# Patient Record
Sex: FEMALE | Race: AMERICAN INDIAN OR ALASKA NATIVE
[De-identification: names, ages, dates, MRNs, and addresses within clinical notes are randomized per-mention and may not be internally consistent; named-entity substitution may affect disease eponyms.]

---

## 2022-05-25 ENCOUNTER — HOSPITAL ENCOUNTER (EMERGENCY)
Dept: HOSPITAL 5 - ED | Age: 47
Discharge: HOME | End: 2022-05-25
Payer: COMMERCIAL

## 2022-05-25 VITALS — DIASTOLIC BLOOD PRESSURE: 60 MMHG | SYSTOLIC BLOOD PRESSURE: 145 MMHG

## 2022-05-25 DIAGNOSIS — Z88.1: ICD-10-CM

## 2022-05-25 DIAGNOSIS — R10.13: Primary | ICD-10-CM

## 2022-05-25 DIAGNOSIS — Z72.89: ICD-10-CM

## 2022-05-25 DIAGNOSIS — F17.290: ICD-10-CM

## 2022-05-25 DIAGNOSIS — Z88.0: ICD-10-CM

## 2022-05-25 DIAGNOSIS — R07.89: ICD-10-CM

## 2022-05-25 LAB
ALBUMIN SERPL-MCNC: 4.2 G/DL (ref 3.9–5)
ALT SERPL-CCNC: 27 UNITS/L (ref 7–56)
BASOPHILS # (AUTO): 0 K/MM3 (ref 0–0.1)
BASOPHILS NFR BLD AUTO: 0.7 % (ref 0–1.8)
BILIRUB UR QL STRIP: (no result)
BLOOD UR QL VISUAL: (no result)
BUN SERPL-MCNC: 10 MG/DL (ref 7–17)
BUN SERPL-MCNC: 9 MG/DL (ref 7–17)
BUN/CREAT SERPL: 13 %
BUN/CREAT SERPL: 14 %
CALCIUM SERPL-MCNC: 8.2 MG/DL (ref 8.4–10.2)
CALCIUM SERPL-MCNC: 8.7 MG/DL (ref 8.4–10.2)
EOSINOPHIL # BLD AUTO: 0 K/MM3 (ref 0–0.4)
EOSINOPHIL NFR BLD AUTO: 0.7 % (ref 0–4.3)
HCT VFR BLD CALC: 39.7 % (ref 30.3–42.9)
HEMOLYSIS INDEX: 4
HEMOLYSIS INDEX: 6
HGB BLD-MCNC: 13.2 GM/DL (ref 10.1–14.3)
INR PPP: 0.87 (ref 0.87–1.13)
LYMPHOCYTES # BLD AUTO: 1.7 K/MM3 (ref 1.2–5.4)
LYMPHOCYTES NFR BLD AUTO: 33.3 % (ref 13.4–35)
MCHC RBC AUTO-ENTMCNC: 33 % (ref 30–34)
MCV RBC AUTO: 91 FL (ref 79–97)
MONOCYTES # (AUTO): 0.2 K/MM3 (ref 0–0.8)
MONOCYTES % (AUTO): 3.6 % (ref 0–7.3)
MUCOUS THREADS #/AREA URNS HPF: (no result) /HPF
PH UR STRIP: 9 [PH] (ref 5–7)
PLATELET # BLD: 327 K/MM3 (ref 140–440)
RBC # BLD AUTO: 4.37 M/MM3 (ref 3.65–5.03)
RBC #/AREA URNS HPF: 3 /HPF (ref 0–6)
UROBILINOGEN UR-MCNC: < 2 MG/DL (ref ?–2)
WBC #/AREA URNS HPF: 1 /HPF (ref 0–6)

## 2022-05-25 PROCEDURE — 85025 COMPLETE CBC W/AUTO DIFF WBC: CPT

## 2022-05-25 PROCEDURE — 99285 EMERGENCY DEPT VISIT HI MDM: CPT

## 2022-05-25 PROCEDURE — 96366 THER/PROPH/DIAG IV INF ADDON: CPT

## 2022-05-25 PROCEDURE — 81025 URINE PREGNANCY TEST: CPT

## 2022-05-25 PROCEDURE — 71046 X-RAY EXAM CHEST 2 VIEWS: CPT

## 2022-05-25 PROCEDURE — 81001 URINALYSIS AUTO W/SCOPE: CPT

## 2022-05-25 PROCEDURE — 96375 TX/PRO/DX INJ NEW DRUG ADDON: CPT

## 2022-05-25 PROCEDURE — 36415 COLL VENOUS BLD VENIPUNCTURE: CPT

## 2022-05-25 PROCEDURE — 80048 BASIC METABOLIC PNL TOTAL CA: CPT

## 2022-05-25 PROCEDURE — 85610 PROTHROMBIN TIME: CPT

## 2022-05-25 PROCEDURE — 84484 ASSAY OF TROPONIN QUANT: CPT

## 2022-05-25 PROCEDURE — 80320 DRUG SCREEN QUANTALCOHOLS: CPT

## 2022-05-25 PROCEDURE — 83735 ASSAY OF MAGNESIUM: CPT

## 2022-05-25 PROCEDURE — 80053 COMPREHEN METABOLIC PANEL: CPT

## 2022-05-25 PROCEDURE — 83690 ASSAY OF LIPASE: CPT

## 2022-05-25 PROCEDURE — 96365 THER/PROPH/DIAG IV INF INIT: CPT

## 2022-05-25 PROCEDURE — 82962 GLUCOSE BLOOD TEST: CPT

## 2022-05-25 PROCEDURE — 93005 ELECTROCARDIOGRAM TRACING: CPT

## 2022-05-25 PROCEDURE — C9113 INJ PANTOPRAZOLE SODIUM, VIA: HCPCS

## 2022-05-25 PROCEDURE — G0480 DRUG TEST DEF 1-7 CLASSES: HCPCS

## 2022-05-25 NOTE — EMERGENCY DEPARTMENT REPORT
ED General Adult HPI





- General


Chief complaint: Alcohol


Stated complaint: Epigastric abdominal pain and burning


Time Seen by Provider: 22 11:19


Source: patient, EMS ( EMS documentation not available at time of chart 

dictation ), RN notes reviewed


Mode of arrival: Stretcher


Limitations: No Limitations





- History of Present Illness


Initial comments: 





The patient was evaluated in the emergency department for symptoms described in 

the history of present illness.  He/she was evaluated in the context of the 

global COVID-19 pandemic, which necessitated consideration that the patient 

might be at risk for infection with the virus that causes COVID-19.  

Institutional protocols and algorithms that pertain to the evaluation of 

patients at risk for COVID-19 are in a state of rapid change based on 

information released by regulatory bodies including the CDC and federal and 

state organizations.  These policies and algorithms were followed during the pat

ient's care in the emergency department.  Please note that these policies, 

procedures and recommendations changed on a rapid basis.





This is a pleasant and cooperative 46-year-old female, with a history of 

bariatric surgery, presenting to the ER today with a complaint of epigastric and

central chest wall pain and burning, after consuming alcohol yesterday.  Pain 

increases with palpation and with swallowing.  The patient denies headache, 

midline neck pain, diaphoresis and exertional shortness of breath.  She has had 

some mild nausea and vomiting.  It is now resolved.  It is nonexertional.  She 

denies aspirin use, oral contraceptive use, leg pain, leg swelling, the 

possibility of pregnancy, and she denies DVT/PE risk factors.  Also denies 

family history of DVT/PE/ACS.





She is asking for something to eat and drink at this time.  She reports some 

nonspecific depression, but she is not homicidal or suicidal





Symptoms started at 9:00 this morning


-: hour(s)


Location: abdomen


Radiation: other (Chest wall pain)


Severity scale (0 -10): 4


Quality: burning, aching


Consistency: constant


Improves with: medication, rest


Worsens with: eating, movement, other (Palpation)





- Related Data


                                  Previous Rx's











 Medication  Instructions  Recorded  Last Taken  Type


 


Acetaminophen [Non-Aspirin Extra 500 mg PO Q6HR PRN #30 tablet 22 Unknown 

Rx





Strength]    


 


Metoclopramide [Reglan] 10 mg PO QID PRN #30 tablet 22 Unknown Rx


 


Multivitamin with Folic Acid [Cvs 400 mcg PO QDAY #30 tablet 22 Unknown Rx





One Daily Essential Tablet]    


 


Pantoprazole [Protonix TAB] 20 mg PO BID #60 tab 22 Unknown Rx











                                    Allergies











Allergy/AdvReac Type Severity Reaction Status Date / Time


 


cephalexin [From Keflex] Allergy  Rash Verified 22 12:39


 


Penicillins Allergy  Rash Verified 22 12:39














ED Review of Systems


ROS: 


Stated complaint: CP/NAUSEA/TREMORS


Other details as noted in HPI





Constitutional: denies: fever


Eyes: denies: eye discharge


ENT: denies: epistaxis


Respiratory: denies: shortness of breath


Cardiovascular: chest pain (Chest wall pain)


Gastrointestinal: abdominal pain (Epigastric abdominal pain), nausea, vomiting


Genitourinary: denies: dysuria


Musculoskeletal: denies: back pain


Neurological: denies: weakness


Psychiatric: anxiety.  denies: homicidal thoughts, suicidal thoughts





ED Past Medical Hx





- Social History


Smoking Status: Never Smoker


Substance Use Type: Alcohol





- Medications


Home Medications: 


                                Home Medications











 Medication  Instructions  Recorded  Confirmed  Last Taken  Type


 


Acetaminophen [Non-Aspirin Extra 500 mg PO Q6HR PRN #30 tablet 22  Unknown

 Rx





Strength]     


 


Metoclopramide [Reglan] 10 mg PO QID PRN #30 tablet 22  Unknown Rx


 


Multivitamin with Folic Acid [Cvs 400 mcg PO QDAY #30 tablet 22  Unknown 

Rx





One Daily Essential Tablet]     


 


Pantoprazole [Protonix TAB] 20 mg PO BID #60 tab 22  Unknown Rx














ED Physical Exam





- General


Limitations: No Limitations


General appearance: alert, in no apparent distress





- Head


Head exam: Present: atraumatic, normocephalic





- Eye


Eye exam: Present: normal appearance, EOMI.  Absent: nystagmus





- ENT


ENT exam: Present: normal exam, normal orophraynx, mucous membranes moist, 

normal external ear exam





- Neck


Neck exam: Present: normal inspection, full ROM.  Absent: tenderness, 

meningismus





- Respiratory


Respiratory exam: Present: normal lung sounds bilaterally.  Absent: respiratory 

distress, wheezes, rales, rhonchi, stridor, decreased breath sounds





- Cardiovascular


Cardiovascular Exam: Present: regular rate, normal rhythm, normal heart sounds. 

Absent: bradycardia, tachycardia, irregular rhythm, systolic murmur, diastolic 

murmur, rubs, gallop





- GI/Abdominal


GI/Abdominal exam: Present: soft.  Absent: distended, tenderness, guarding, 

rebound, rigid, pulsatile mass





- Extremities Exam


Extremities exam: Present: normal inspection, full ROM, other (2+ pulses noted 

in the bilateral upper and lower extremities.  There is no palpable cord.   

negative Homans sign.  Muscular compartments are soft.  The pelvis is stable.). 

Absent: pedal edema, calf tenderness





- Back Exam


Back exam: Present: normal inspection, full ROM.  Absent: tenderness, CVA 

tenderness (R), CVA tenderness (L), paraspinal tenderness, vertebral tenderness





- Neurological Exam


Neurological exam: Present: alert, oriented X3, normal gait, other (No facial 

droop.  Tongue midline.  Extraocular movements intact bilaterally.  Facial 

sensation intact to light touch in V1, V2, V3 distribution bilaterally.  5 and a

5 strength in 4 extremities.  Sensation intact to light touch in 4 

extremities.).  Absent: motor sensory deficit





- Psychiatric


Psychiatric exam: Absent: homicidal ideation, suicidal ideation





- Skin


Skin exam: Present: warm, dry, intact, normal color.  Absent: rash





ED Course


                                   Vital Signs











  22





  09:53 10:02 10:42


 


Temperature 98.3 F  


 


Pulse Rate 98 H  


 


Respiratory 16  





Rate   


 


Blood Pressure   132/76


 


Blood Pressure 140/84  





[Left]   


 


O2 Sat by Pulse 98 99 





Oximetry   


 


O2 Sat by Pulse   





Oximetry [   





Digit-Finger]   














  22





  10:45 11:00 12:56


 


Temperature   


 


Pulse Rate   80


 


Respiratory   17





Rate   


 


Blood Pressure 132/76 132/76 


 


Blood Pressure   128/77





[Left]   


 


O2 Sat by Pulse 99 100 99





Oximetry   


 


O2 Sat by Pulse   





Oximetry [   





Digit-Finger]   














  22





  13:36 13:47


 


Temperature  


 


Pulse Rate  84


 


Respiratory  17





Rate  


 


Blood Pressure  


 


Blood Pressure  130/88





[Left]  


 


O2 Sat by Pulse  99





Oximetry  


 


O2 Sat by Pulse 99 





Oximetry [  





Digit-Finger]  














- Reevaluation(s)


Reevaluation #1: 





22 13:31


Differential diagnosis, including but not limited to: GERD, gastritis, hiatal 

hernia, pneumonia, costochondritis, alcoholic gastritis, dehydration





Assessment and plan: 46-year-old female, who is afebrile, with reassuring vital 

signs, clinically sober, with a GCS of 15, who is not currently tachycardic, 

tachypneic or hypoxic, who denies DVT/PE risk factors, low risk by Wells 

criteria for pulmonary embolism, PERC negative, low risk for major adverse 

cardiac event as per heart score, presenting with probable alcohol induced 

gastritis, and superimposed costochondritis.  Mild anion gap likely secondary to

dehydration, history of nausea and vomiting, along with alcohol consumption.





The patient does not meet criteria for 1013 hold or involuntary confinement.








Repeat troponin, repeat EKG pending.  Glucose of 65 likely secondary to alcohol,

nausea, vomiting, and dehydration.





Very unlikely to be acute aortic disease, has equal pulses in the upper and 

lower extremities, no pulsatile abdominal mass, not especially hypertensive, and

x-ray of the chest is unremarkable.  Patient currently on cell phone, and in no 

acute distress


22 14:54


Troponin negative x2.  EKG unchanged x3.  Chest x-ray unremarkable.  

Hypoglycemia resolved.





Patient eating food, and in no acute distress.





Discussed all findings with patient.  She articulates understanding.  All 

questions answered





Return precautions are reviewed





- Pulse Oximetry Interpretation


  ** Digit-Finger


Initial Pulse Oximetry Readin


O2 Sat by Pulse Oximetry: 99


Actions Taken: none





ED Medical Decision Making





- Lab Data


Result diagrams: 


                                 22 10:18





                                22 Unknown








                                   Vital Signs











  22





  09:53 10:02 10:42


 


Temperature 98.3 F  


 


Pulse Rate 98 H  


 


Respiratory 16  





Rate   


 


Blood Pressure   132/76


 


Blood Pressure 140/84  





[Left]   


 


O2 Sat by Pulse 98 99 





Oximetry   














  22





  10:45 11:00 12:56


 


Temperature   


 


Pulse Rate   80


 


Respiratory   17





Rate   


 


Blood Pressure 132/76 132/76 


 


Blood Pressure   128/77





[Left]   


 


O2 Sat by Pulse 99 100 99





Oximetry   











                                   Lab Results











  22 Range/Units





  10:18 10:18 10:18 


 


WBC  5.3    (4.5-11.0)  K/mm3


 


RBC  4.37    (3.65-5.03)  M/mm3


 


Hgb  13.2    (10.1-14.3)  gm/dl


 


Hct  39.7    (30.3-42.9)  %


 


MCV  91    (79-97)  fl


 


MCH  30    (28-32)  pg


 


MCHC  33    (30-34)  %


 


RDW  15.1    (13.2-15.2)  %


 


Plt Count  327    (140-440)  K/mm3


 


Lymph % (Auto)  33.3    (13.4-35.0)  %


 


Mono % (Auto)  3.6    (0.0-7.3)  %


 


Eos % (Auto)  0.7    (0.0-4.3)  %


 


Baso % (Auto)  0.7    (0.0-1.8)  %


 


Lymph # (Auto)  1.7    (1.2-5.4)  K/mm3


 


Mono # (Auto)  0.2    (0.0-0.8)  K/mm3


 


Eos # (Auto)  0.0    (0.0-0.4)  K/mm3


 


Baso # (Auto)  0.0    (0.0-0.1)  K/mm3


 


Seg Neutrophils %  61.7    (40.0-70.0)  %


 


Seg Neutrophils #  3.2    (1.8-7.7)  K/mm3


 


PT   12.7   (12.2-14.9)  Sec.


 


INR   0.87   (0.87-1.13)  


 


Sodium    134 L  (137-145)  mmol/L


 


Potassium    3.7  (3.6-5.0)  mmol/L


 


Chloride    96.8 L  ()  mmol/L


 


Carbon Dioxide    18 L  (22-30)  mmol/L


 


Anion Gap    23  mmol/L


 


BUN    10  (7-17)  mg/dL


 


Creatinine    0.7  (0.6-1.2)  mg/dL


 


Estimated GFR    > 60  ml/min


 


BUN/Creatinine Ratio    14  %


 


Glucose    65  ()  mg/dL


 


POC Glucose     ()  mg/dL


 


Calcium    8.7  (8.4-10.2)  mg/dL


 


Magnesium    1.70  (1.7-2.3)  mg/dL


 


Total Bilirubin    0.50  (0.1-1.2)  mg/dL


 


AST    34  (5-40)  units/L


 


ALT    27  (7-56)  units/L


 


Alkaline Phosphatase    87  ()  units/L


 


Troponin T     (0.00-0.029)  ng/mL


 


Total Protein    7.9  (6.3-8.2)  g/dL


 


Albumin    4.2  (3.9-5)  g/dL


 


Albumin/Globulin Ratio    1.1  %


 


Lipase    15  (13-60)  units/L


 


Urine Color     (Yellow)  


 


Urine Turbidity     (Clear)  


 


Urine pH     (5.0-7.0)  


 


Ur Specific Gravity     (1.003-1.030)  


 


Urine Protein     (Negative)  mg/dL


 


Urine Glucose (UA)     (Negative)  mg/dL


 


Urine Ketones     (Negative)  mg/dL


 


Urine Blood     (Negative)  


 


Urine Nitrite     (Negative)  


 


Urine Bilirubin     (Negative)  


 


Urine Urobilinogen     (<2.0)  mg/dL


 


Ur Leukocyte Esterase     (Negative)  


 


Urine WBC (Auto)     (0.0-6.0)  /HPF


 


Urine RBC (Auto)     (0.0-6.0)  /HPF


 


U Epithel Cells (Auto)     (0-13.0)  /HPF


 


Urine Mucus     /HPF


 


Urine HCG, Qual     (Negative)  


 


Plasma/Serum Alcohol     (0-0.07)  %














  22 Range/Units





  10:18 10:18 12:54 


 


WBC     (4.5-11.0)  K/mm3


 


RBC     (3.65-5.03)  M/mm3


 


Hgb     (10.1-14.3)  gm/dl


 


Hct     (30.3-42.9)  %


 


MCV     (79-97)  fl


 


MCH     (28-32)  pg


 


MCHC     (30-34)  %


 


RDW     (13.2-15.2)  %


 


Plt Count     (140-440)  K/mm3


 


Lymph % (Auto)     (13.4-35.0)  %


 


Mono % (Auto)     (0.0-7.3)  %


 


Eos % (Auto)     (0.0-4.3)  %


 


Baso % (Auto)     (0.0-1.8)  %


 


Lymph # (Auto)     (1.2-5.4)  K/mm3


 


Mono # (Auto)     (0.0-0.8)  K/mm3


 


Eos # (Auto)     (0.0-0.4)  K/mm3


 


Baso # (Auto)     (0.0-0.1)  K/mm3


 


Seg Neutrophils %     (40.0-70.0)  %


 


Seg Neutrophils #     (1.8-7.7)  K/mm3


 


PT     (12.2-14.9)  Sec.


 


INR     (0.87-1.13)  


 


Sodium     (137-145)  mmol/L


 


Potassium     (3.6-5.0)  mmol/L


 


Chloride     ()  mmol/L


 


Carbon Dioxide     (22-30)  mmol/L


 


Anion Gap     mmol/L


 


BUN     (7-17)  mg/dL


 


Creatinine     (0.6-1.2)  mg/dL


 


Estimated GFR     ml/min


 


BUN/Creatinine Ratio     %


 


Glucose     ()  mg/dL


 


POC Glucose    65 L  ()  mg/dL


 


Calcium     (8.4-10.2)  mg/dL


 


Magnesium     (1.7-2.3)  mg/dL


 


Total Bilirubin     (0.1-1.2)  mg/dL


 


AST     (5-40)  units/L


 


ALT     (7-56)  units/L


 


Alkaline Phosphatase     ()  units/L


 


Troponin T   < 0.010   (0.00-0.029)  ng/mL


 


Total Protein     (6.3-8.2)  g/dL


 


Albumin     (3.9-5)  g/dL


 


Albumin/Globulin Ratio     %


 


Lipase     (13-60)  units/L


 


Urine Color     (Yellow)  


 


Urine Turbidity     (Clear)  


 


Urine pH     (5.0-7.0)  


 


Ur Specific Gravity     (1.003-1.030)  


 


Urine Protein     (Negative)  mg/dL


 


Urine Glucose (UA)     (Negative)  mg/dL


 


Urine Ketones     (Negative)  mg/dL


 


Urine Blood     (Negative)  


 


Urine Nitrite     (Negative)  


 


Urine Bilirubin     (Negative)  


 


Urine Urobilinogen     (<2.0)  mg/dL


 


Ur Leukocyte Esterase     (Negative)  


 


Urine WBC (Auto)     (0.0-6.0)  /HPF


 


Urine RBC (Auto)     (0.0-6.0)  /HPF


 


U Epithel Cells (Auto)     (0-13.0)  /HPF


 


Urine Mucus     /HPF


 


Urine HCG, Qual     (Negative)  


 


Plasma/Serum Alcohol  < 0.01    (0-0.07)  %














  22 Range/Units





  Unknown Unknown 


 


WBC    (4.5-11.0)  K/mm3


 


RBC    (3.65-5.03)  M/mm3


 


Hgb    (10.1-14.3)  gm/dl


 


Hct    (30.3-42.9)  %


 


MCV    (79-97)  fl


 


MCH    (28-32)  pg


 


MCHC    (30-34)  %


 


RDW    (13.2-15.2)  %


 


Plt Count    (140-440)  K/mm3


 


Lymph % (Auto)    (13.4-35.0)  %


 


Mono % (Auto)    (0.0-7.3)  %


 


Eos % (Auto)    (0.0-4.3)  %


 


Baso % (Auto)    (0.0-1.8)  %


 


Lymph # (Auto)    (1.2-5.4)  K/mm3


 


Mono # (Auto)    (0.0-0.8)  K/mm3


 


Eos # (Auto)    (0.0-0.4)  K/mm3


 


Baso # (Auto)    (0.0-0.1)  K/mm3


 


Seg Neutrophils %    (40.0-70.0)  %


 


Seg Neutrophils #    (1.8-7.7)  K/mm3


 


PT    (12.2-14.9)  Sec.


 


INR    (0.87-1.13)  


 


Sodium    (137-145)  mmol/L


 


Potassium    (3.6-5.0)  mmol/L


 


Chloride    ()  mmol/L


 


Carbon Dioxide    (22-30)  mmol/L


 


Anion Gap    mmol/L


 


BUN    (7-17)  mg/dL


 


Creatinine    (0.6-1.2)  mg/dL


 


Estimated GFR    ml/min


 


BUN/Creatinine Ratio    %


 


Glucose    ()  mg/dL


 


POC Glucose    ()  mg/dL


 


Calcium    (8.4-10.2)  mg/dL


 


Magnesium    (1.7-2.3)  mg/dL


 


Total Bilirubin    (0.1-1.2)  mg/dL


 


AST    (5-40)  units/L


 


ALT    (7-56)  units/L


 


Alkaline Phosphatase    ()  units/L


 


Troponin T    (0.00-0.029)  ng/mL


 


Total Protein    (6.3-8.2)  g/dL


 


Albumin    (3.9-5)  g/dL


 


Albumin/Globulin Ratio    %


 


Lipase    (13-60)  units/L


 


Urine Color  Yellow   (Yellow)  


 


Urine Turbidity  Clear   (Clear)  


 


Urine pH  9.0 H   (5.0-7.0)  


 


Ur Specific Gravity  1.024   (1.003-1.030)  


 


Urine Protein  100 mg/dl   (Negative)  mg/dL


 


Urine Glucose (UA)  Neg   (Negative)  mg/dL


 


Urine Ketones  80   (Negative)  mg/dL


 


Urine Blood  Neg   (Negative)  


 


Urine Nitrite  Neg   (Negative)  


 


Urine Bilirubin  Neg   (Negative)  


 


Urine Urobilinogen  < 2.0   (<2.0)  mg/dL


 


Ur Leukocyte Esterase  Neg   (Negative)  


 


Urine WBC (Auto)  1.0   (0.0-6.0)  /HPF


 


Urine RBC (Auto)  3.0   (0.0-6.0)  /HPF


 


U Epithel Cells (Auto)  3.0   (0-13.0)  /HPF


 


Urine Mucus  Few   /HPF


 


Urine HCG, Qual   Negative  (Negative)  


 


Plasma/Serum Alcohol    (0-0.07)  %











                                   Lab Results











  22 Range/Units





  10:18 10:18 10:18 


 


WBC  5.3    (4.5-11.0)  K/mm3


 


RBC  4.37    (3.65-5.03)  M/mm3


 


Hgb  13.2    (10.1-14.3)  gm/dl


 


Hct  39.7    (30.3-42.9)  %


 


MCV  91    (79-97)  fl


 


MCH  30    (28-32)  pg


 


MCHC  33    (30-34)  %


 


RDW  15.1    (13.2-15.2)  %


 


Plt Count  327    (140-440)  K/mm3


 


Lymph % (Auto)  33.3    (13.4-35.0)  %


 


Mono % (Auto)  3.6    (0.0-7.3)  %


 


Eos % (Auto)  0.7    (0.0-4.3)  %


 


Baso % (Auto)  0.7    (0.0-1.8)  %


 


Lymph # (Auto)  1.7    (1.2-5.4)  K/mm3


 


Mono # (Auto)  0.2    (0.0-0.8)  K/mm3


 


Eos # (Auto)  0.0    (0.0-0.4)  K/mm3


 


Baso # (Auto)  0.0    (0.0-0.1)  K/mm3


 


Seg Neutrophils %  61.7    (40.0-70.0)  %


 


Seg Neutrophils #  3.2    (1.8-7.7)  K/mm3


 


PT   12.7   (12.2-14.9)  Sec.


 


INR   0.87   (0.87-1.13)  


 


Sodium    134 L  (137-145)  mmol/L


 


Potassium    3.7  (3.6-5.0)  mmol/L


 


Chloride    96.8 L  ()  mmol/L


 


Carbon Dioxide    18 L  (22-30)  mmol/L


 


Anion Gap    23  mmol/L


 


BUN    10  (7-17)  mg/dL


 


Creatinine    0.7  (0.6-1.2)  mg/dL


 


Estimated GFR    > 60  ml/min


 


BUN/Creatinine Ratio    14  %


 


Glucose    65  ()  mg/dL


 


POC Glucose     ()  mg/dL


 


Calcium    8.7  (8.4-10.2)  mg/dL


 


Magnesium    1.70  (1.7-2.3)  mg/dL


 


Total Bilirubin    0.50  (0.1-1.2)  mg/dL


 


AST    34  (5-40)  units/L


 


ALT    27  (7-56)  units/L


 


Alkaline Phosphatase    87  ()  units/L


 


Troponin T     (0.00-0.029)  ng/mL


 


Total Protein    7.9  (6.3-8.2)  g/dL


 


Albumin    4.2  (3.9-5)  g/dL


 


Albumin/Globulin Ratio    1.1  %


 


Lipase    15  (13-60)  units/L


 


Urine Color     (Yellow)  


 


Urine Turbidity     (Clear)  


 


Urine pH     (5.0-7.0)  


 


Ur Specific Gravity     (1.003-1.030)  


 


Urine Protein     (Negative)  mg/dL


 


Urine Glucose (UA)     (Negative)  mg/dL


 


Urine Ketones     (Negative)  mg/dL


 


Urine Blood     (Negative)  


 


Urine Nitrite     (Negative)  


 


Urine Bilirubin     (Negative)  


 


Urine Urobilinogen     (<2.0)  mg/dL


 


Ur Leukocyte Esterase     (Negative)  


 


Urine WBC (Auto)     (0.0-6.0)  /HPF


 


Urine RBC (Auto)     (0.0-6.0)  /HPF


 


U Epithel Cells (Auto)     (0-13.0)  /HPF


 


Urine Mucus     /HPF


 


Urine HCG, Qual     (Negative)  


 


Plasma/Serum Alcohol     (0-0.07)  %














  22 Range/Units





  10:18 10:18 12:54 


 


WBC     (4.5-11.0)  K/mm3


 


RBC     (3.65-5.03)  M/mm3


 


Hgb     (10.1-14.3)  gm/dl


 


Hct     (30.3-42.9)  %


 


MCV     (79-97)  fl


 


MCH     (28-32)  pg


 


MCHC     (30-34)  %


 


RDW     (13.2-15.2)  %


 


Plt Count     (140-440)  K/mm3


 


Lymph % (Auto)     (13.4-35.0)  %


 


Mono % (Auto)     (0.0-7.3)  %


 


Eos % (Auto)     (0.0-4.3)  %


 


Baso % (Auto)     (0.0-1.8)  %


 


Lymph # (Auto)     (1.2-5.4)  K/mm3


 


Mono # (Auto)     (0.0-0.8)  K/mm3


 


Eos # (Auto)     (0.0-0.4)  K/mm3


 


Baso # (Auto)     (0.0-0.1)  K/mm3


 


Seg Neutrophils %     (40.0-70.0)  %


 


Seg Neutrophils #     (1.8-7.7)  K/mm3


 


PT     (12.2-14.9)  Sec.


 


INR     (0.87-1.13)  


 


Sodium     (137-145)  mmol/L


 


Potassium     (3.6-5.0)  mmol/L


 


Chloride     ()  mmol/L


 


Carbon Dioxide     (22-30)  mmol/L


 


Anion Gap     mmol/L


 


BUN     (7-17)  mg/dL


 


Creatinine     (0.6-1.2)  mg/dL


 


Estimated GFR     ml/min


 


BUN/Creatinine Ratio     %


 


Glucose     ()  mg/dL


 


POC Glucose    65 L  ()  mg/dL


 


Calcium     (8.4-10.2)  mg/dL


 


Magnesium     (1.7-2.3)  mg/dL


 


Total Bilirubin     (0.1-1.2)  mg/dL


 


AST     (5-40)  units/L


 


ALT     (7-56)  units/L


 


Alkaline Phosphatase     ()  units/L


 


Troponin T   < 0.010   (0.00-0.029)  ng/mL


 


Total Protein     (6.3-8.2)  g/dL


 


Albumin     (3.9-5)  g/dL


 


Albumin/Globulin Ratio     %


 


Lipase     (13-60)  units/L


 


Urine Color     (Yellow)  


 


Urine Turbidity     (Clear)  


 


Urine pH     (5.0-7.0)  


 


Ur Specific Gravity     (1.003-1.030)  


 


Urine Protein     (Negative)  mg/dL


 


Urine Glucose (UA)     (Negative)  mg/dL


 


Urine Ketones     (Negative)  mg/dL


 


Urine Blood     (Negative)  


 


Urine Nitrite     (Negative)  


 


Urine Bilirubin     (Negative)  


 


Urine Urobilinogen     (<2.0)  mg/dL


 


Ur Leukocyte Esterase     (Negative)  


 


Urine WBC (Auto)     (0.0-6.0)  /HPF


 


Urine RBC (Auto)     (0.0-6.0)  /HPF


 


U Epithel Cells (Auto)     (0-13.0)  /HPF


 


Urine Mucus     /HPF


 


Urine HCG, Qual     (Negative)  


 


Plasma/Serum Alcohol  < 0.01    (0-0.07)  %














  22 Range/Units





  14:15 Unknown Unknown 


 


WBC     (4.5-11.0)  K/mm3


 


RBC     (3.65-5.03)  M/mm3


 


Hgb     (10.1-14.3)  gm/dl


 


Hct     (30.3-42.9)  %


 


MCV     (79-97)  fl


 


MCH     (28-32)  pg


 


MCHC     (30-34)  %


 


RDW     (13.2-15.2)  %


 


Plt Count     (140-440)  K/mm3


 


Lymph % (Auto)     (13.4-35.0)  %


 


Mono % (Auto)     (0.0-7.3)  %


 


Eos % (Auto)     (0.0-4.3)  %


 


Baso % (Auto)     (0.0-1.8)  %


 


Lymph # (Auto)     (1.2-5.4)  K/mm3


 


Mono # (Auto)     (0.0-0.8)  K/mm3


 


Eos # (Auto)     (0.0-0.4)  K/mm3


 


Baso # (Auto)     (0.0-0.1)  K/mm3


 


Seg Neutrophils %     (40.0-70.0)  %


 


Seg Neutrophils #     (1.8-7.7)  K/mm3


 


PT     (12.2-14.9)  Sec.


 


INR     (0.87-1.13)  


 


Sodium     (137-145)  mmol/L


 


Potassium     (3.6-5.0)  mmol/L


 


Chloride     ()  mmol/L


 


Carbon Dioxide     (22-30)  mmol/L


 


Anion Gap     mmol/L


 


BUN     (7-17)  mg/dL


 


Creatinine     (0.6-1.2)  mg/dL


 


Estimated GFR     ml/min


 


BUN/Creatinine Ratio     %


 


Glucose     ()  mg/dL


 


POC Glucose  112 H    ()  mg/dL


 


Calcium     (8.4-10.2)  mg/dL


 


Magnesium     (1.7-2.3)  mg/dL


 


Total Bilirubin     (0.1-1.2)  mg/dL


 


AST     (5-40)  units/L


 


ALT     (7-56)  units/L


 


Alkaline Phosphatase     ()  units/L


 


Troponin T     (0.00-0.029)  ng/mL


 


Total Protein     (6.3-8.2)  g/dL


 


Albumin     (3.9-5)  g/dL


 


Albumin/Globulin Ratio     %


 


Lipase     (13-60)  units/L


 


Urine Color   Yellow   (Yellow)  


 


Urine Turbidity   Clear   (Clear)  


 


Urine pH   9.0 H   (5.0-7.0)  


 


Ur Specific Gravity   1.024   (1.003-1.030)  


 


Urine Protein   100 mg/dl   (Negative)  mg/dL


 


Urine Glucose (UA)   Neg   (Negative)  mg/dL


 


Urine Ketones   80   (Negative)  mg/dL


 


Urine Blood   Neg   (Negative)  


 


Urine Nitrite   Neg   (Negative)  


 


Urine Bilirubin   Neg   (Negative)  


 


Urine Urobilinogen   < 2.0   (<2.0)  mg/dL


 


Ur Leukocyte Esterase   Neg   (Negative)  


 


Urine WBC (Auto)   1.0   (0.0-6.0)  /HPF


 


Urine RBC (Auto)   3.0   (0.0-6.0)  /HPF


 


U Epithel Cells (Auto)   3.0   (0-13.0)  /HPF


 


Urine Mucus   Few   /HPF


 


Urine HCG, Qual    Negative  (Negative)  


 


Plasma/Serum Alcohol     (0-0.07)  %














  // Range/Units





  Unknown 


 


WBC   (4.5-11.0)  K/mm3


 


RBC   (3.65-5.03)  M/mm3


 


Hgb   (10.1-14.3)  gm/dl


 


Hct   (30.3-42.9)  %


 


MCV   (79-97)  fl


 


MCH   (28-32)  pg


 


MCHC   (30-34)  %


 


RDW   (13.2-15.2)  %


 


Plt Count   (140-440)  K/mm3


 


Lymph % (Auto)   (13.4-35.0)  %


 


Mono % (Auto)   (0.0-7.3)  %


 


Eos % (Auto)   (0.0-4.3)  %


 


Baso % (Auto)   (0.0-1.8)  %


 


Lymph # (Auto)   (1.2-5.4)  K/mm3


 


Mono # (Auto)   (0.0-0.8)  K/mm3


 


Eos # (Auto)   (0.0-0.4)  K/mm3


 


Baso # (Auto)   (0.0-0.1)  K/mm3


 


Seg Neutrophils %   (40.0-70.0)  %


 


Seg Neutrophils #   (1.8-7.7)  K/mm3


 


PT   (12.2-14.9)  Sec.


 


INR   (0.87-1.13)  


 


Sodium  137  (137-145)  mmol/L


 


Potassium  3.7  (3.6-5.0)  mmol/L


 


Chloride  99.5  ()  mmol/L


 


Carbon Dioxide  20 L  (22-30)  mmol/L


 


Anion Gap  21  mmol/L


 


BUN  9  (7-17)  mg/dL


 


Creatinine  0.7  (0.6-1.2)  mg/dL


 


Estimated GFR  > 60  ml/min


 


BUN/Creatinine Ratio  13  %


 


Glucose  127 H  ()  mg/dL


 


POC Glucose   ()  mg/dL


 


Calcium  8.2 L  (8.4-10.2)  mg/dL


 


Magnesium   (1.7-2.3)  mg/dL


 


Total Bilirubin   (0.1-1.2)  mg/dL


 


AST   (5-40)  units/L


 


ALT   (7-56)  units/L


 


Alkaline Phosphatase   ()  units/L


 


Troponin T  < 0.010  (0.00-0.029)  ng/mL


 


Total Protein   (6.3-8.2)  g/dL


 


Albumin   (3.9-5)  g/dL


 


Albumin/Globulin Ratio   %


 


Lipase   (13-60)  units/L


 


Urine Color   (Yellow)  


 


Urine Turbidity   (Clear)  


 


Urine pH   (5.0-7.0)  


 


Ur Specific Gravity   (1.003-1.030)  


 


Urine Protein   (Negative)  mg/dL


 


Urine Glucose (UA)   (Negative)  mg/dL


 


Urine Ketones   (Negative)  mg/dL


 


Urine Blood   (Negative)  


 


Urine Nitrite   (Negative)  


 


Urine Bilirubin   (Negative)  


 


Urine Urobilinogen   (<2.0)  mg/dL


 


Ur Leukocyte Esterase   (Negative)  


 


Urine WBC (Auto)   (0.0-6.0)  /HPF


 


Urine RBC (Auto)   (0.0-6.0)  /HPF


 


U Epithel Cells (Auto)   (0-13.0)  /HPF


 


Urine Mucus   /HPF


 


Urine HCG, Qual   (Negative)  


 


Plasma/Serum Alcohol   (0-0.07)  %














- EKG Data


-: EKG Interpreted by Me


EKG shows normal: sinus rhythm


Rate: normal





- EKG Data


When compared to previous EKG there are: previous EKG unavailable





22 13:28











The EKG is initially interpreted at 10: 45





Sinus rhythm, 44 bpm.  Normal axis, normal P wave axis, T wave inversions V2 and

 V3.  First-degree AV block, WA interval 202 ms.  This is an abnormal EKG.  This

 is not a STEMI.





- Radiology Data


Radiology results: pending, report reviewed, image reviewed





CHEST 2 VIEWS  INDICATION / CLINICAL INFORMATION: chest discomfort.  COMPARISON:

 None available.  FINDINGS:  SUPPORT DEVICES: None.  HEART / MEDIASTINUM: No 

significant abnormality.  LUNGS / PLEURA: No significant pulmonary or pleural 

abnormality. No pneumothorax.  ADDITIONAL FINDINGS: No significant additional 

findings.  IMPRESSION: 1. No acute findings.  Signer Name: Joshua Harris Jr, MD Signed: 2022 11:43 AM Workstation Name: [id number]


Critical care attestation.: 


If time is entered above; I have spent that time in minutes in the direct care 

of this critically ill patient, excluding procedure time.








ED Disposition


Clinical Impression: 


 Epigastric abdominal pain, Nonspecific chest pain, Alcohol use





Disposition:  HOME / SELF CARE / HOMELESS


Is pt being admited?: No


Does the pt Need Aspirin: No


Condition: Good


Instructions:  Nonspecific Chest Pain, Adult


Additional Instructions: 


Recommend that patient avoid consumption of alcohol, tobacco, smoke products, 

Motrin, ibuprofen, Naprosyn, Aleve, heavy and spicy foods.  Recommend patient 

follow-up with her primary care doctor or cardiologist within the next 3 to 5 

days.  Patient may take the prescribed medications as needed and directed.





Please return to the emergency room right away with new pain, worsened pain, 

migration of pain, projectile vomiting, change in mental status, confusion, 

inability tolerate liquid feeds, new, worsened or different symptoms not present

 on the initial emergency room evaluation


Prescriptions: 


Multivitamin with Folic Acid [Cvs One Daily Essential Tablet] 400 mcg PO QDAY 

#30 tablet


Acetaminophen [Non-Aspirin Extra Strength] 500 mg PO Q6HR PRN #30 tablet


 PRN Reason: Pain , Severe (7-10)


Pantoprazole [Protonix TAB] 20 mg PO BID #60 tab


Metoclopramide [Reglan] 10 mg PO QID PRN #30 tablet


 PRN Reason: Nausea


Referrals: 


SANDIP ESPINOZA MD [Primary Care Provider] - 3-5 Days


[facility] [Provider Group] - 3-5 Days


Forms:  Work/School Release Form(ED)





Heart Score





- HEART Score


History: Slightly suspicious


EKG: Non-specific


Age: < 45


Risk factors: No known risk factors


Troponin: < normal limit


HEART Score: 1





- EKG Read Time


Time EKG Completed: 10:45


EKG Read Time: 10:45





- Critical Actions


Critical Actions: 0-3 pts:0.9-1.7%risk of adverse cardiac event.Candidate for 

discharge

## 2022-05-25 NOTE — XRAY REPORT
CHEST 2 VIEWS 



INDICATION / CLINICAL INFORMATION:

chest discomfort.



COMPARISON: 

None available.



FINDINGS:



SUPPORT DEVICES: None.



HEART / MEDIASTINUM: No significant abnormality. 



LUNGS / PLEURA: No significant pulmonary or pleural abnormality. No pneumothorax. 



ADDITIONAL FINDINGS: No significant additional findings.



IMPRESSION:

1. No acute findings.



Signer Name: Joshua Harris Jr, MD 

Signed: 5/25/2022 12:43 PM

Workstation Name: [id number]

## 2022-05-27 NOTE — ELECTROCARDIOGRAPH REPORT
[facility]

                                       

Test Date:    2022               Test Time:    13:37:51

Pat Name:     JOSE MIGUEL BE                Department:   

Patient ID:   [id number]          Room:          

Gender:       F                        Technician:   0000

:          1975               Requested By: GAYLE MEADE

Order Number: [id number]              Reading MD:   Solo Mcclure

                                 Measurements

Intervals                              Axis          

Rate:         73                       P:            55

WI:           240                      QRS:          27

QRSD:         95                       T:            23

QT:           435                                    

QTc:          478                                    

                           Interpretive Statements

Sinus rhythm

Prolonged WI interval

NSSTTW'S

No previous ECG available for comparison

Electronically Signed On 2022 10:05:17 EDT by Solo Mcclure

## 2022-05-27 NOTE — ELECTROCARDIOGRAPH REPORT
[facility]

                                       

Test Date:    2022               Test Time:    10:45:19

Pat Name:     JOSE MIGUEL BE                Department:   

Patient ID:   [id number]          Room:          

Gender:       F                        Technician:   0000

:          1975               Requested By: LILA BALDWIN

Order Number: [id number]              Reading MD:   Solo Mcclure

                                 Measurements

Intervals                              Axis          

Rate:         74                       P:            43

IN:           202                      QRS:          37

QRSD:         94                       T:            33

QT:           434                                    

QTc:          483                                    

                           Interpretive Statements

Sinus rhythm

NSSTTW'S

Borderline prolonged IN interval

No previous ECG available for comparison

Electronically Signed On 2022 9:58:48 EDT by Solo Mcclure